# Patient Record
Sex: FEMALE | Race: WHITE | Employment: FULL TIME | URBAN - METROPOLITAN AREA
[De-identification: names, ages, dates, MRNs, and addresses within clinical notes are randomized per-mention and may not be internally consistent; named-entity substitution may affect disease eponyms.]

---

## 2022-08-15 ENCOUNTER — HOSPITAL ENCOUNTER (EMERGENCY)
Facility: HOSPITAL | Age: 32
Discharge: HOME | End: 2022-08-15
Attending: EMERGENCY MEDICINE
Payer: COMMERCIAL

## 2022-08-15 VITALS
SYSTOLIC BLOOD PRESSURE: 118 MMHG | TEMPERATURE: 98 F | RESPIRATION RATE: 16 BRPM | HEART RATE: 81 BPM | OXYGEN SATURATION: 98 % | DIASTOLIC BLOOD PRESSURE: 70 MMHG

## 2022-08-15 DIAGNOSIS — R11.2 NAUSEA AND VOMITING, UNSPECIFIED VOMITING TYPE: Primary | ICD-10-CM

## 2022-08-15 LAB
ALBUMIN SERPL-MCNC: 4.2 G/DL (ref 3.4–5)
ALP LIVER SERPL-CCNC: 45 U/L
ALT SERPL-CCNC: 20 U/L
ANION GAP SERPL CALC-SCNC: 7 MMOL/L (ref 0–18)
AST SERPL-CCNC: 19 U/L (ref 15–37)
BASOPHILS # BLD AUTO: 0.02 X10(3) UL (ref 0–0.2)
BASOPHILS NFR BLD AUTO: 0.2 %
BILIRUB DIRECT SERPL-MCNC: 0.2 MG/DL (ref 0–0.2)
BILIRUB SERPL-MCNC: 0.9 MG/DL (ref 0.1–2)
BILIRUB UR QL: NEGATIVE
BUN BLD-MCNC: 20 MG/DL (ref 7–18)
BUN/CREAT SERPL: 25.3 (ref 10–20)
CALCIUM BLD-MCNC: 8.7 MG/DL (ref 8.5–10.1)
CHLORIDE SERPL-SCNC: 108 MMOL/L (ref 98–112)
CLARITY UR: CLEAR
CO2 SERPL-SCNC: 26 MMOL/L (ref 21–32)
COLOR UR: YELLOW
CREAT BLD-MCNC: 0.79 MG/DL
DEPRECATED RDW RBC AUTO: 42.9 FL (ref 35.1–46.3)
EOSINOPHIL # BLD AUTO: 0.06 X10(3) UL (ref 0–0.7)
EOSINOPHIL NFR BLD AUTO: 0.5 %
ERYTHROCYTE [DISTWIDTH] IN BLOOD BY AUTOMATED COUNT: 12.1 % (ref 11–15)
GFR SERPLBLD BASED ON 1.73 SQ M-ARVRAT: 101 ML/MIN/1.73M2 (ref 60–?)
GLUCOSE BLD-MCNC: 97 MG/DL (ref 70–99)
GLUCOSE UR-MCNC: NEGATIVE MG/DL
HCT VFR BLD AUTO: 45 %
HGB BLD-MCNC: 14.8 G/DL
HGB UR QL STRIP.AUTO: NEGATIVE
IMM GRANULOCYTES # BLD AUTO: 0.03 X10(3) UL (ref 0–1)
IMM GRANULOCYTES NFR BLD: 0.2 %
LEUKOCYTE ESTERASE UR QL STRIP.AUTO: NEGATIVE
LIPASE SERPL-CCNC: 126 U/L (ref 73–393)
LYMPHOCYTES # BLD AUTO: 0.6 X10(3) UL (ref 1–4)
LYMPHOCYTES NFR BLD AUTO: 4.7 %
MCH RBC QN AUTO: 31.6 PG (ref 26–34)
MCHC RBC AUTO-ENTMCNC: 32.9 G/DL (ref 31–37)
MCV RBC AUTO: 95.9 FL
MONOCYTES # BLD AUTO: 0.36 X10(3) UL (ref 0.1–1)
MONOCYTES NFR BLD AUTO: 2.8 %
NEUTROPHILS # BLD AUTO: 11.69 X10 (3) UL (ref 1.5–7.7)
NEUTROPHILS # BLD AUTO: 11.69 X10(3) UL (ref 1.5–7.7)
NEUTROPHILS NFR BLD AUTO: 91.6 %
NITRITE UR QL STRIP.AUTO: NEGATIVE
OSMOLALITY SERPL CALC.SUM OF ELEC: 295 MOSM/KG (ref 275–295)
PH UR: 7 [PH] (ref 5–8)
PLATELET # BLD AUTO: 377 10(3)UL (ref 150–450)
POTASSIUM SERPL-SCNC: 4 MMOL/L (ref 3.5–5.1)
PROT SERPL-MCNC: 8 G/DL (ref 6.4–8.2)
RBC # BLD AUTO: 4.69 X10(6)UL
RBC #/AREA URNS AUTO: >10 /HPF
RBC #/AREA URNS AUTO: >10 /HPF
SARS-COV-2 RNA RESP QL NAA+PROBE: NOT DETECTED
SODIUM SERPL-SCNC: 141 MMOL/L (ref 136–145)
SP GR UR STRIP: 1.02 (ref 1–1.03)
UROBILINOGEN UR STRIP-ACNC: 0.2
WBC # BLD AUTO: 12.8 X10(3) UL (ref 4–11)

## 2022-08-15 PROCEDURE — 96374 THER/PROPH/DIAG INJ IV PUSH: CPT

## 2022-08-15 PROCEDURE — 80048 BASIC METABOLIC PNL TOTAL CA: CPT | Performed by: EMERGENCY MEDICINE

## 2022-08-15 PROCEDURE — 80076 HEPATIC FUNCTION PANEL: CPT | Performed by: EMERGENCY MEDICINE

## 2022-08-15 PROCEDURE — 96375 TX/PRO/DX INJ NEW DRUG ADDON: CPT

## 2022-08-15 PROCEDURE — 96361 HYDRATE IV INFUSION ADD-ON: CPT

## 2022-08-15 PROCEDURE — 99284 EMERGENCY DEPT VISIT MOD MDM: CPT

## 2022-08-15 PROCEDURE — S0028 INJECTION, FAMOTIDINE, 20 MG: HCPCS | Performed by: EMERGENCY MEDICINE

## 2022-08-15 PROCEDURE — 81015 MICROSCOPIC EXAM OF URINE: CPT | Performed by: EMERGENCY MEDICINE

## 2022-08-15 PROCEDURE — 81001 URINALYSIS AUTO W/SCOPE: CPT | Performed by: EMERGENCY MEDICINE

## 2022-08-15 PROCEDURE — 83690 ASSAY OF LIPASE: CPT | Performed by: EMERGENCY MEDICINE

## 2022-08-15 PROCEDURE — 85025 COMPLETE CBC W/AUTO DIFF WBC: CPT | Performed by: EMERGENCY MEDICINE

## 2022-08-15 RX ORDER — LORAZEPAM 2 MG/ML
0.5 INJECTION INTRAMUSCULAR ONCE
Status: COMPLETED | OUTPATIENT
Start: 2022-08-15 | End: 2022-08-15

## 2022-08-15 RX ORDER — ONDANSETRON 2 MG/ML
4 INJECTION INTRAMUSCULAR; INTRAVENOUS ONCE
Status: COMPLETED | OUTPATIENT
Start: 2022-08-15 | End: 2022-08-15

## 2022-08-15 RX ORDER — FAMOTIDINE 10 MG/ML
20 INJECTION, SOLUTION INTRAVENOUS ONCE
Status: COMPLETED | OUTPATIENT
Start: 2022-08-15 | End: 2022-08-15

## 2022-08-15 RX ORDER — ONDANSETRON 4 MG/1
4 TABLET, ORALLY DISINTEGRATING ORAL EVERY 4 HOURS PRN
Qty: 12 TABLET | Refills: 0 | Status: SHIPPED | OUTPATIENT
Start: 2022-08-15 | End: 2022-08-22

## 2022-08-15 NOTE — ED INITIAL ASSESSMENT (HPI)
31y F to ED via [facility] for nausea and vomiting. Has been nauseous with frequent vomiting since about 1am. Attempted PO zofran without relief.

## 2023-05-16 ENCOUNTER — APPOINTMENT (OUTPATIENT)
Dept: URBAN - METROPOLITAN AREA CLINIC 314 | Age: 33
Setting detail: DERMATOLOGY
End: 2023-05-28

## 2023-05-16 DIAGNOSIS — L81.4 OTHER MELANIN HYPERPIGMENTATION: ICD-10-CM

## 2023-05-16 DIAGNOSIS — L91.8 OTHER HYPERTROPHIC DISORDERS OF THE SKIN: ICD-10-CM

## 2023-05-16 DIAGNOSIS — D22 MELANOCYTIC NEVI: ICD-10-CM

## 2023-05-16 DIAGNOSIS — Z12.83 ENCOUNTER FOR SCREENING FOR MALIGNANT NEOPLASM OF SKIN: ICD-10-CM

## 2023-05-16 PROBLEM — D48.5 NEOPLASM OF UNCERTAIN BEHAVIOR OF SKIN: Status: ACTIVE | Noted: 2023-05-16

## 2023-05-16 PROBLEM — D22.5 MELANOCYTIC NEVI OF TRUNK: Status: ACTIVE | Noted: 2023-05-16

## 2023-05-16 PROCEDURE — 99203 OFFICE O/P NEW LOW 30 MIN: CPT | Mod: 25

## 2023-05-16 PROCEDURE — OTHER MIPS QUALITY: OTHER

## 2023-05-16 PROCEDURE — OTHER BIOPSY BY SHAVE METHOD: OTHER

## 2023-05-16 PROCEDURE — 11102 TANGNTL BX SKIN SINGLE LES: CPT

## 2023-05-16 PROCEDURE — 11103 TANGNTL BX SKIN EA SEP/ADDL: CPT

## 2023-05-16 PROCEDURE — OTHER COUNSELING: OTHER

## 2023-05-16 ASSESSMENT — LOCATION SIMPLE DESCRIPTION DERM
LOCATION SIMPLE: UPPER BACK
LOCATION SIMPLE: RIGHT 4TH TOE
LOCATION SIMPLE: CHEST
LOCATION SIMPLE: RIGHT ANTERIOR NECK
LOCATION SIMPLE: RIGHT UPPER BACK

## 2023-05-16 ASSESSMENT — LOCATION ZONE DERM
LOCATION ZONE: NECK
LOCATION ZONE: TOE
LOCATION ZONE: TRUNK

## 2023-05-16 ASSESSMENT — LOCATION DETAILED DESCRIPTION DERM
LOCATION DETAILED: SUPERIOR THORACIC SPINE
LOCATION DETAILED: RIGHT LATERAL SUPERIOR CHEST
LOCATION DETAILED: RIGHT SUPERIOR MEDIAL UPPER BACK
LOCATION DETAILED: RIGHT MEDIAL SUPERIOR CHEST
LOCATION DETAILED: RIGHT CLAVICULAR NECK
LOCATION DETAILED: LEFT MEDIAL SUPERIOR CHEST
LOCATION DETAILED: RIGHT LATERAL 4TH TOE

## 2023-05-16 NOTE — PROCEDURE: COUNSELING
Patient Specific Counseling (Will Not Stick From Patient To Patient): -\\nPt was counseled on a potential snip removal
Detail Level: Detailed
Detail Level: Generalized

## 2023-05-16 NOTE — PROCEDURE: BIOPSY BY SHAVE METHOD
Size Of Lesion In Cm: 0
Hemostasis: Drysol
Electrodesiccation Text: The wound bed was treated with electrodesiccation after the biopsy was performed.
Biopsy Method: Dermablade
Depth Of Biopsy: dermis
Anesthesia Volume In Cc (Will Not Render If 0): 0.5
Hide Anesthesia Volume?: No
Curettage Text: The wound bed was treated with curettage after the biopsy was performed.
Silver Nitrate Text: The wound bed was treated with silver nitrate after the biopsy was performed.
Post-Care Instructions: I reviewed with the patient in detail post-care instructions. Patient is to keep the biopsy site dry overnight, and then apply bacitracin twice daily until healed. Patient may apply hydrogen peroxide soaks to remove any crusting.
Anesthesia Type: 2% lidocaine with epinephrine and a 1:10 solution of 8.4% sodium bicarbonate
Detail Level: Detailed
Billing Type: Third-Party Bill
Notification Instructions: Patient will be notified of biopsy results. However, patient instructed to call the office if not contacted within 2 weeks.
Cryotherapy Text: The wound bed was treated with cryotherapy after the biopsy was performed.
Information: Selecting Yes will display possible errors in your note based on the variables you have selected. This validation is only offered as a suggestion for you. PLEASE NOTE THAT THE VALIDATION TEXT WILL BE REMOVED WHEN YOU FINALIZE YOUR NOTE. IF YOU WANT TO FAX A PRELIMINARY NOTE YOU WILL NEED TO TOGGLE THIS TO 'NO' IF YOU DO NOT WANT IT IN YOUR FAXED NOTE.
Consent: Written consent was obtained and risks were reviewed including but not limited to scarring, infection, bleeding, scabbing, incomplete removal, nerve damage and allergy to anesthesia.
Electrodesiccation And Curettage Text: The wound bed was treated with electrodesiccation and curettage after the biopsy was performed.
Biopsy Type: H and E
Type Of Destruction Used: Curettage
Dressing: Band-Aid
Was A Bandage Applied: Yes
Wound Care: Vaseline

## 2024-03-09 ENCOUNTER — HOSPITAL ENCOUNTER (EMERGENCY)
Facility: HOSPITAL | Age: 34
Discharge: HOME | End: 2024-03-09
Attending: EMERGENCY MEDICINE
Payer: COMMERCIAL

## 2024-03-09 ENCOUNTER — APPOINTMENT (OUTPATIENT)
Dept: MRI IMAGING | Facility: HOSPITAL | Age: 34
End: 2024-03-09
Attending: EMERGENCY MEDICINE
Payer: COMMERCIAL

## 2024-03-09 ENCOUNTER — APPOINTMENT (OUTPATIENT)
Dept: CT IMAGING | Facility: HOSPITAL | Age: 34
End: 2024-03-09
Attending: EMERGENCY MEDICINE
Payer: COMMERCIAL

## 2024-03-09 VITALS
DIASTOLIC BLOOD PRESSURE: 75 MMHG | HEART RATE: 61 BPM | SYSTOLIC BLOOD PRESSURE: 116 MMHG | BODY MASS INDEX: 32.96 KG/M2 | OXYGEN SATURATION: 97 % | HEIGHT: 67 IN | RESPIRATION RATE: 18 BRPM | TEMPERATURE: 97 F | WEIGHT: 210 LBS

## 2024-03-09 DIAGNOSIS — M54.2 NECK PAIN: Primary | ICD-10-CM

## 2024-03-09 DIAGNOSIS — R51.9 NONINTRACTABLE HEADACHE, UNSPECIFIED CHRONICITY PATTERN, UNSPECIFIED HEADACHE TYPE: ICD-10-CM

## 2024-03-09 LAB
ALBUMIN SERPL-MCNC: 4.7 G/DL (ref 3.2–4.8)
ALP LIVER SERPL-CCNC: 40 U/L
ALT SERPL-CCNC: 19 U/L
ANION GAP SERPL CALC-SCNC: 2 MMOL/L (ref 0–18)
AST SERPL-CCNC: 28 U/L (ref ?–34)
BASOPHILS # BLD AUTO: 0.03 X10(3) UL (ref 0–0.2)
BASOPHILS NFR BLD AUTO: 0.6 %
BILIRUB DIRECT SERPL-MCNC: 0.2 MG/DL (ref ?–0.3)
BILIRUB SERPL-MCNC: 0.7 MG/DL (ref 0.3–1.2)
BUN BLD-MCNC: 13 MG/DL (ref 9–23)
BUN/CREAT SERPL: 15.5 (ref 10–20)
CALCIUM BLD-MCNC: 9.6 MG/DL (ref 8.7–10.4)
CHLORIDE SERPL-SCNC: 111 MMOL/L (ref 98–112)
CO2 SERPL-SCNC: 26 MMOL/L (ref 21–32)
CREAT BLD-MCNC: 0.84 MG/DL
DEPRECATED RDW RBC AUTO: 42.4 FL (ref 35.1–46.3)
EGFRCR SERPLBLD CKD-EPI 2021: 93 ML/MIN/1.73M2 (ref 60–?)
EOSINOPHIL # BLD AUTO: 0.12 X10(3) UL (ref 0–0.7)
EOSINOPHIL NFR BLD AUTO: 2.3 %
ERYTHROCYTE [DISTWIDTH] IN BLOOD BY AUTOMATED COUNT: 12.3 % (ref 11–15)
GLUCOSE BLD-MCNC: 88 MG/DL (ref 70–99)
HCT VFR BLD AUTO: 42.7 %
HGB BLD-MCNC: 14.1 G/DL
IMM GRANULOCYTES # BLD AUTO: 0.01 X10(3) UL (ref 0–1)
IMM GRANULOCYTES NFR BLD: 0.2 %
LYMPHOCYTES # BLD AUTO: 2.29 X10(3) UL (ref 1–4)
LYMPHOCYTES NFR BLD AUTO: 43 %
MCH RBC QN AUTO: 30.7 PG (ref 26–34)
MCHC RBC AUTO-ENTMCNC: 33 G/DL (ref 31–37)
MCV RBC AUTO: 92.8 FL
MONOCYTES # BLD AUTO: 0.34 X10(3) UL (ref 0.1–1)
MONOCYTES NFR BLD AUTO: 6.4 %
NEUTROPHILS # BLD AUTO: 2.54 X10 (3) UL (ref 1.5–7.7)
NEUTROPHILS # BLD AUTO: 2.54 X10(3) UL (ref 1.5–7.7)
NEUTROPHILS NFR BLD AUTO: 47.5 %
OSMOLALITY SERPL CALC.SUM OF ELEC: 288 MOSM/KG (ref 275–295)
PLATELET # BLD AUTO: 409 10(3)UL (ref 150–450)
POTASSIUM SERPL-SCNC: 3.7 MMOL/L (ref 3.5–5.1)
PROCALCITONIN SERPL-MCNC: <0.04 NG/ML (ref ?–0.05)
PROT SERPL-MCNC: 7.7 G/DL (ref 5.7–8.2)
RBC # BLD AUTO: 4.6 X10(6)UL
SODIUM SERPL-SCNC: 139 MMOL/L (ref 136–145)
WBC # BLD AUTO: 5.3 X10(3) UL (ref 4–11)

## 2024-03-09 PROCEDURE — 96375 TX/PRO/DX INJ NEW DRUG ADDON: CPT

## 2024-03-09 PROCEDURE — 70498 CT ANGIOGRAPHY NECK: CPT | Performed by: EMERGENCY MEDICINE

## 2024-03-09 PROCEDURE — 99285 EMERGENCY DEPT VISIT HI MDM: CPT

## 2024-03-09 PROCEDURE — 80048 BASIC METABOLIC PNL TOTAL CA: CPT | Performed by: EMERGENCY MEDICINE

## 2024-03-09 PROCEDURE — 80076 HEPATIC FUNCTION PANEL: CPT | Performed by: EMERGENCY MEDICINE

## 2024-03-09 PROCEDURE — 70496 CT ANGIOGRAPHY HEAD: CPT | Performed by: EMERGENCY MEDICINE

## 2024-03-09 PROCEDURE — 85025 COMPLETE CBC W/AUTO DIFF WBC: CPT | Performed by: EMERGENCY MEDICINE

## 2024-03-09 PROCEDURE — 70551 MRI BRAIN STEM W/O DYE: CPT | Performed by: EMERGENCY MEDICINE

## 2024-03-09 PROCEDURE — 84145 PROCALCITONIN (PCT): CPT | Performed by: EMERGENCY MEDICINE

## 2024-03-09 PROCEDURE — 96374 THER/PROPH/DIAG INJ IV PUSH: CPT

## 2024-03-09 RX ORDER — DIPHENHYDRAMINE HYDROCHLORIDE 50 MG/ML
25 INJECTION INTRAMUSCULAR; INTRAVENOUS ONCE
Status: COMPLETED | OUTPATIENT
Start: 2024-03-09 | End: 2024-03-09

## 2024-03-09 RX ORDER — KETOROLAC TROMETHAMINE 15 MG/ML
15 INJECTION, SOLUTION INTRAMUSCULAR; INTRAVENOUS ONCE
Status: COMPLETED | OUTPATIENT
Start: 2024-03-09 | End: 2024-03-09

## 2024-03-09 RX ORDER — TRAMADOL HYDROCHLORIDE 50 MG/1
TABLET ORAL EVERY 6 HOURS PRN
Qty: 10 TABLET | Refills: 0 | Status: SHIPPED | OUTPATIENT
Start: 2024-03-09 | End: 2024-03-14

## 2024-03-09 RX ORDER — QUETIAPINE FUMARATE 50 MG/1
50 TABLET, FILM COATED ORAL NIGHTLY
COMMUNITY

## 2024-03-09 RX ORDER — LAMOTRIGINE 200 MG/1
200 TABLET ORAL DAILY
COMMUNITY

## 2024-03-09 RX ORDER — METOCLOPRAMIDE HYDROCHLORIDE 5 MG/ML
10 INJECTION INTRAMUSCULAR; INTRAVENOUS ONCE
Status: COMPLETED | OUTPATIENT
Start: 2024-03-09 | End: 2024-03-09

## 2024-03-09 RX ORDER — ONDANSETRON 4 MG/1
4 TABLET, ORALLY DISINTEGRATING ORAL EVERY 4 HOURS PRN
Qty: 10 TABLET | Refills: 0 | Status: SHIPPED | OUTPATIENT
Start: 2024-03-09 | End: 2024-03-16

## 2024-03-09 NOTE — ED PROVIDER NOTES
Patient Seen in: [facility] Emergency Department      History     Chief Complaint   Patient presents with    Headache    Fever     Stated Complaint: headaches,neck pain,fevers,vomiting    Subjective:   34-year-old female history of bipolar here with chief complaint of 4 days of neck pain and 3 days of headache.  She said she woke up 4 days ago and her neck felt stiff.  It did not hurt with movements.  Later that day she started getting some pressure in her occipital region.  The headache is about up 4-5 out of 10 but it is not going away for the past 3 days.  It was not sudden.  Her neck still has pain with certain positions or movement.  She has no cough or congestion.  No nausea or vomiting.  No photophobia.  No focal weakness or numbness.  She said she had some chills yesterday but no recorded fevers.  No dizziness or vertigo.  Has had vertigo in the past.            Objective:   Past Medical History:   Diagnosis Date    Bipolar affective (HCC)               Past Surgical History:   Procedure Laterality Date    TOTAL ABDOM HYSTERECTOMY                  No pertinent social history.            Review of Systems    Positive for stated complaint: headaches,neck pain,fevers,vomiting  Other systems are as noted in HPI.  Constitutional and vital signs reviewed.      All other systems reviewed and negative except as noted above.    Physical Exam     ED Triage Vitals [03/09/24 0817]   /90   Pulse 88   Resp 18   Temp 97.1 °F (36.2 °C)   Temp src Temporal   SpO2 100 %   O2 Device None (Room air)       Current:/75   Pulse 61   Temp 97.1 °F (36.2 °C) (Temporal)   Resp 18   Ht 170.2 cm (5' 7\")   Wt 95.3 kg   SpO2 97%   BMI 32.89 kg/m²         Physical Exam  Constitutional:       General: She is not in acute distress.     Comments: Will spell.  Eyes open in the lit room.  Moving head in conversation.  Laughing during conversation.   HENT:      Head: Normocephalic.      Mouth/Throat:      Mouth:  Mucous membranes are moist.      Pharynx: Oropharynx is clear.   Eyes:      Extraocular Movements: Extraocular movements intact.      Pupils: Pupils are equal, round, and reactive to light.   Cardiovascular:      Rate and Rhythm: Normal rate and regular rhythm.      Heart sounds: Normal heart sounds.   Pulmonary:      Effort: Pulmonary effort is normal.      Breath sounds: Normal breath sounds.   Abdominal:      Palpations: Abdomen is soft.      Tenderness: There is no abdominal tenderness.   Musculoskeletal:         General: No swelling or tenderness. Normal range of motion.      Cervical back: Normal range of motion and neck supple.   Lymphadenopathy:      Cervical: No cervical adenopathy.   Skin:     General: Skin is warm.      Capillary Refill: Capillary refill takes less than 2 seconds.   Neurological:      Mental Status: She is alert and oriented to person, place, and time.      Sensory: No sensory deficit.      Motor: No weakness.      Comments: Negative jolt accentuation test.  She is able to shake her head back-and-forth.  Negative Kernig's and Brudzinski.  Completely supple neck on exam.               ED Course     Labs Reviewed   HEPATIC FUNCTION PANEL (7) - Normal   BASIC METABOLIC PANEL (8) - Normal   PROCALCITONIN - Normal   CBC WITH DIFFERENTIAL WITH PLATELET    Narrative:     The following orders were created for panel order CBC With Differential With Platelet.  Procedure                               Abnormality         Status                     ---------                               -----------         ------                     CBC W/ DIFFERENTIAL[[id number]]                              Final result                 Please view results for these tests on the individual orders.   RAINBOW DRAW LAVENDER   RAINBOW DRAW LIGHT GREEN   RAINBOW DRAW GOLD   RAINBOW DRAW BLUE   CBC W/ DIFFERENTIAL          ED Course as of 03/10/24 1404  ------------------------------------------------------------  Time:  03/09 1017  Comment: Labs independently interpreted by me.  CBC, CMP unremarkable  ------------------------------------------------------------  Time: 03/10 1403  Comment: CTA and MRI independently interpreted by me.  No stroke on MRI.  Some possible congenital abnormalities on CTA but no aneurysms  ------------------------------------------------------------  Time: 03/10 1404  Comment: Patient responded to medications and felt better when she was in the ER.  This note was added the day after.              [facility]      MRI BRAIN WO ACUTE (3) SEQUENCE (CPT=70551)    Result Date: 3/9/2024  CONCLUSION:  1. Limited ER protocol 3 sequence imaging of the brain was performed.  No acute intracranial abnormality. Specifically, no evidence of acute or early subacute infarction. 2. As there is no acute posterior fossa infarction, basilar and distal vertebral artery findings described on same date CT arteriography of the head and neck are therefore likely chronic and/or developmental.   elm-remote  Dictated by (CST): Tito Cheek MD on 3/09/2024 at 12:55 PM     Finalized by (CST): Tito Cheek MD on 3/09/2024 at 12:57 PM          CTA BRAIN + CTA CAROTIDS (CPT=70496/54987)    Result Date: 3/9/2024  CONCLUSION:  1. Both vertebral arteries are diminutive in caliber, but otherwise patent.  There is, however, thready flow throughout the basilar artery as well as the distal V4 segments of both vertebral arteries.  These findings could relate to sequelae of chronic vertebrobasilar insufficiency or a developmental variant.  Although acute basilar occlusion is unlikely, suggest a follow-up MRI of the brain to exclude acute infarction.  Findings were discussed with Dr. Suarez at time of dictation. 2. No other proximal intracranial flow limiting stenosis/large vessel occlusion.  There is a fetal configuration of both posterior cerebral arteries.  No definite intracranial aneurysm identified.  No hemodynamically significant stenosis of  the cervical carotid or vertebral arteries. 3. Noncontrast head CT demonstrates no acute intracranial abnormality. 4. Lesser incidental findings as above.   elm-remote  Dictated by (CST): Tito Cheek MD on 3/09/2024 at 10:21 AM     Finalized by (CST): Tito Cheek MD on 3/09/2024 at 10:31 AM                                            Medical Decision Making  Patient here with neck pain that was followed by some headache.  This has been going on for 4 days she has no neurologic complaints such as weakness or numbness.  No vertigo.  Completely supple on exam afebrile with normal vitals.  She looks well.  Unlikely meningitis but we discussed the possibility.  Musculoskeletal problems are in the differential.  She could have dissection or mass.  Will give Benadryl Reglan while doing workup and will discuss with neurology.    Amount and/or Complexity of Data Reviewed  Labs: ordered. Decision-making details documented in ED Course.     Details: Labs normal  Radiology: ordered and independent interpretation performed.     Details: CTA showed some abnormal flow in the vertebral basilar possible anomaly.  Discussed with neurologist Dr. Barnes who does not feel this is an acute problem.  An MRI was performed and showed no acute finding.  Discussion of management or test interpretation with external provider(s): Patient did improve after medication.  Does not seem to be stroke, dissection, meningitis or hemorrhage.  Does need close neurologic follow-up.  She had a congenital anomaly likely seen on her CTA and I discussed that with the radiologist.  She was comfortable with the follow-up plan and will return to the ER for changes or worsening.  Prescription for pain medicine written.  She voiced understanding.  Never looked to be in significant distress    Risk  OTC drugs.  Prescription drug management.        Disposition and Plan     Clinical Impression:  1. Neck pain    2. Nonintractable headache, unspecified  chronicity pattern, unspecified headache type         Disposition:  Discharge  3/9/2024  3:16 pm    Follow-up:  Aminah Persaud DO  [address] [address]  [phone]    Follow up            Medications Prescribed:  Discharge Medication List as of 3/9/2024  3:24 PM        START taking these medications    Details   traMADol 50 MG Oral Tab Take 1-2 tablets ( mg total) by mouth every 6 (six) hours as needed for Pain., Normal, Disp-10 tablet, R-0      ondansetron 4 MG Oral Tablet Dispersible Take 1 tablet (4 mg total) by mouth every 4 (four) hours as needed for Nausea., Normal, Disp-10 tablet, R-0

## 2024-03-09 NOTE — ED INITIAL ASSESSMENT (HPI)
Pt to the ed for headache and neck pain for 5 days  Also reporting chills with nausea and vomiting that began this morning

## 2024-03-09 NOTE — ED QUICK NOTES
Pain and neck stiffness began Tuesday morning.   Head pain beginning Wednesday, constant pressure.

## 2024-03-09 NOTE — DISCHARGE INSTRUCTIONS
To the ER for changes or worsening or new symptoms.  Read all instructions for further recommendations.  Take tramadol as needed for pain.  Ibuprofen every 8 hours as needed.  Zofran for nausea.  Follow-up with neurology as soon as possible for further evaluation.

## 2024-03-14 ENCOUNTER — OFFICE VISIT (OUTPATIENT)
Dept: NEUROLOGY | Facility: CLINIC | Age: 34
End: 2024-03-14
Payer: COMMERCIAL

## 2024-03-14 VITALS — HEIGHT: 67 IN | WEIGHT: 210 LBS | BODY MASS INDEX: 32.96 KG/M2

## 2024-03-14 DIAGNOSIS — M54.81 BILATERAL OCCIPITAL NEURALGIA: ICD-10-CM

## 2024-03-14 DIAGNOSIS — G43.E11 INTRACTABLE CHRONIC MIGRAINE WITH AURA WITH STATUS MIGRAINOSUS: Primary | ICD-10-CM

## 2024-03-14 DIAGNOSIS — G44.40 MEDICATION OVERUSE HEADACHE: ICD-10-CM

## 2024-03-14 DIAGNOSIS — G44.86 CERVICOGENIC HEADACHE: ICD-10-CM

## 2024-03-14 DIAGNOSIS — R42 VERTIGO: ICD-10-CM

## 2024-03-14 PROCEDURE — 3008F BODY MASS INDEX DOCD: CPT | Performed by: OTHER

## 2024-03-14 PROCEDURE — 99204 OFFICE O/P NEW MOD 45 MIN: CPT | Performed by: OTHER

## 2024-03-14 RX ORDER — MECLIZINE HCL 25MG 25 MG/1
25 TABLET, CHEWABLE ORAL 3 TIMES DAILY PRN
Qty: 90 TABLET | Refills: 0 | Status: SHIPPED | OUTPATIENT
Start: 2024-03-14 | End: 2024-04-13

## 2024-03-14 RX ORDER — ELETRIPTAN HYDROBROMIDE 40 MG/1
TABLET, FILM COATED ORAL
Qty: 8 TABLET | Refills: 0 | Status: SHIPPED | OUTPATIENT
Start: 2024-03-14

## 2024-03-14 RX ORDER — MECLIZINE HCL 25MG 25 MG/1
TABLET, CHEWABLE ORAL
COMMUNITY
Start: 2023-12-08 | End: 2024-03-14 | Stop reason: ALTCHOICE

## 2024-03-14 RX ORDER — PROPRANOLOL HYDROCHLORIDE 10 MG/1
10 TABLET ORAL 2 TIMES DAILY
Qty: 180 TABLET | Refills: 0 | Status: SHIPPED | OUTPATIENT
Start: 2024-03-14 | End: 2024-06-12

## 2024-03-14 NOTE — PATIENT INSTRUCTIONS
HEADACHE / MIGRAINE LIFESTYLE INFORMATION     Headache Preventive Treatment:   Please keep in mind that it takes 4-6 weeks for the medication to start working well and 2-3 months at the appropriate dose before deciding if it will be useful or not. If it is not helping at all by this time, then we will discuss other medications to try. Supplements may take 3-6 months until you see full effect.     Natural supplements:  Magnesium Oxide 400 mg at bedtime   Vitamin B2- 400mg in the morning     Vitamins and herbs that show potential    Magnesium: Magnesium (400 mg at bedtime) has a relaxant effect on smooth muscles such as blood vessels. Individuals suffering from frequent or daily headache usually have low magnesium levels which can be increase with daily supplementation of 400-750 mg. Three trials found 40-90% average headache reduction  when used as a preventative. Magnesium also demonstrated the benefit in menstrually related migraine.  Magnesium is part of the messenger system in the serotonin cascade and it is a good muscle relaxant.  It is also useful for constipation which can be a side effect of other medications used to treat migraine. Good sources include nuts, whole grains, and tomatoes.    Riboflavin (vitamin B 2) 400 mg in the morning. This vitamin assists nerve cells in the production of ATP a principal energy storing molecule.  It is necessary for many chemical reactions in the body.  There have been at least 3 clinical trials of riboflavin using 400 mg per day all of which suggested that migraine frequency can be decreased.  All 3 trials showed significant improvement in over half of migraine sufferers.  The supplement is found in bread, cereal, milk, meat, and poultry.  Most Americans get more riboflavin than the recommended daily allowance, however riboflavin deficiency is not necessary for the supplements to help prevent headache.    Melatonin: Increasing evidence shows correlation between melatonin  secretion and headache conditions.  Melatonin supplementation has decreased headache intensity and duration.  It is widely used as a sleep aid.  Sleep is natures way of dealing with migraine.  A dose of 3 mg is recommended to start for headaches including cluster headache. Higher doses up to 15 mg has been reviewed for use in Cluster headache and have been used.  The rationale behind using melatonin for cluster is that many theories regarding the cause of Cluster headache center around the disruption of the normal circadian rhythm in the brain.  This helps restore the normal circadian rhythm.    Kelly: Kelly has a small amount of antihistamine and anti-inflammatory action which may help headache.  It is primarily used for nausea and may aid in the absorption of other medications.    HEADACHE DIET: Foods and beverages which may trigger migraine  Note that only 20% of headache patients are food sensitive. You will know if you are food sensitive if you get a headache consistently 20 minutes to 2 hours after eating a certain food. Only cut out a food if it causes headaches, otherwise you might remove foods you enjoy! What matters most for diet is to eat a well balanced healthy diet full of vegetables and low fat protein, and to not miss meals.    Chocolate, other sweets    ALL cheeses except cottage and cream cheese    Dairy products, yogurt, sour cream, ice cream    Liver    Meat extracts (Bovril, Marmite, meat tenderizers)    Meats or fish which have undergone aging, fermenting, pickling or smoking. These include: Hotdogs,salami,Lox,sausage,  mortadellas,smoked salmon, pepperoni, Pickled herring    Pods of broad bean (English beans, Chinese pea pods, Italian (pushpa) beans, lima and navy beans    Ripe avocado, ripe banana    Yeast extracts or active yeast preparations such as Bedolla's or Maricarmen's (commercial bakes goods are permitted)    Tomato based foods, pizza (lasagna, etc.)    MSG (monosodium glutamate) is  disguised as many things; look for these common aliases:  Monopotassium glutamate  Autolysed yeast  Hydrolysed protein  Sodium caseinate  “flavorings”  “all natural preservatives\"    Nutrasweet    Avoid all other foods that convincingly provoke headaches.    Headache Prevention Strategies:    1. Maintain a headache diary; learn to identify and avoid triggers. Common triggers include:    Emotional triggers:  Emotional/Upset family or friends  Emotional/Upset occupation  Business reversal/success  Anticipation anxiety  Crisis-serious  Post-crisis periodNew job/position     Physical triggers:  Vacation Day  Weekend  Strenuous Exercise  High Altitude Location  New Move  Menstrual Day  Physical Illness  Oversleep/Not enough sleep  Weather changes  Light: Photophobia or light sesnitivity treatment involves a balance between desensitization and reduction in overly strong input. Use dark polarized glasses outside, but not inside. Avoid bright or fluorescent light, but do not dim environment to the point that going into a normally lit room hurts. Consider FL-41 tint lenses, which reduce the most irritating wavelengths without blocking too much light.  These can be obtained at [employer] or [employer]  Foods: see list above.    2. Limit use of acute treatments (over-the-counter medications, triptans, etc.) to no more than 2 days per week or 10 days per month to prevent medication overuse headache (rebound headache).      3. Follow a regular schedule (including weekends and holidays):  Don't skip meals. Eat a balanced diet.  8 hours of sleep nightly.  Minimize stress.  Exercise 30 minutes per day. Being overweight is associated with a 5 times increased risk of chronic migraine.  Keep well hydrated and drink 6-8 glasses of water per day.    4. Initiate non-pharmacologic measures at the earliest onset of your headache.  Rest and quiet environment.  Relax and reduce stress. Ashenjt9Nwgzj is a free wilton that can instruct  you on    some simple relaxtion and breathing techniques. Http://LookIt.[address] is a    free website that provides teaching videos on relaxation.  Also, there are  many apps that   can be downloaded for “mindful” relaxation.  An wilton called YOGA NIDRA will help walk you through mindfulness.  Cold compresses.    5. Don't wait!! Take the maximum allowable dosage of prescribed medication at the first sign of migraine.    6. Compliance:  Take prescribed medication regularly as directed and at the first sign of a migraine.    7. Communicate:  Call your physician when problems arise, especially if your headaches change, increase in frequency/severity, or become associated with neurological symptoms (weakness, numbness, slurred speech, etc.).    8. Headache/pain management therapies: Consider various complementary methods, including medication, behavioral therapy, psychological counselling, biofeedback, massage therapy, acupuncture, dry needling, and other modalities.  Such measures may reduce the need for medications. Counseling for pain management, where patients learn to function and ignore/minimize their pain, seems to work very well.    9. Recommend changing family's attention and focus away from patient's headaches. Instead, emphasize daily activities. If first question of day is 'How are your headaches/Do you have a headache today?', then patient will constantly think about headaches, thus making them worse. Goal is to re-direct attention away from headaches, toward daily activities and other distractions.    10. Helpful Websites:  www.AmericanHeadacheSociety.org  www.migrainetrust.org  www.headaches.org  www.migraine.org.uk  www.achenet.org    11. HEADACHE EXPECTATIONS:  There are many types of headaches, and only a rare few in which complete relief can be expected.  In general, there is no cure for headache, especially migraine based headaches.  There is nothing available that completely prevents headaches from  occurring, breaking through, or having periodic flare-ups and fluctuations.  Regardless of what you are using on a daily basis for prevention, episodic headaches should still be expected, and periods where frequency may escalate and fluctuate are unavoidable.     There is no quick fix for most headaches.  Furthermore, the longer you have had high frequency headaches (such as chronic daily headache), the longer it will likely take to expect any improvement.  In fact, some people will never improve, regardless of how many medications or other treatments we try.  Our treatment strategy is to evaluate for possible causes of your headache, although testing is usually always normal, even in cases of daily continuous headaches for years.  Most types of headache such as migraine are electrical brain disorders (similar to how epilepsy is an electrical brain disorders).  Therefore, there is no testing that will reveal this \"dysfunctional electrical circuitry\" such on MRI, or other testing.  We try to find a medication that may help lessen the frequency and/or severity of your headaches.  The goal is not to completely stop them from happening, although if that happens, great!  Different people respond to different medications, and some people just don't respond to anything, so it's usually a matter of trying different options.  We can not predict if or when exactly you will respond to a treatment that we provide.     Preventive headache medications take 4-6 weeks to start working, and 2-3 months to see full effect, assuming you reach an effective dose.  Therefore, calling or messaging frequently because you have a headache flare prior to the 3 month rosaline is unlikely to change anything, and unfortunately there is nothing available that will expedite this, so please try to avoid this.  Our recommendation will generally be to give it adequate time first.  If you are unable to wait it out for medications to work, we can also try  IV infusions for some temporary relief.       In general, the best that preventive medications or other treatments (including Botox) are able to offer in migraine management (variable in other headache types) is a 50% improvement in frequency and/or severity of headache.  That is our goal, and any additional benefit is considered a bonus.  Some people do significantly better than this, others do not get close to this.  Therefore, if your headaches are not improving by at least 3 months on your preventive strategy, contact us and we can discuss further adjustments.  Keep in mind that complete headache cure is not a realistic expectation.    Our Team:  The nursing staff, and medical assistants are a major part of YOUR TREATMENT TEAM and will be handling your phone calls and inquiries, if any. Unless explicitly told otherwise at the time of your office visit, your study results and ensuing treatment plans will be released via End[employer]ed and discussed during your follow-up appointment.     [employer]hart: Please ask the schedulers to give you an activation code. The main way of communication is by [employer]hart rather than phone lines, so if you have not signed up, please do so. Endymed is also the way that you can review your labs and testing.  We are not able to contact everyone to tell them results are normal.  If you do not hear back from us regarding testing you have had, it should be considered normal or within normal range.  If you have any questions about the results, you are free to message us.     Endymed is meant for simple questions regarding medications, possible side effects, or other simple straight forward questions in limited sentences, rather than multiple paragraphs of discussion.  Endymed is not meant for, or efficient for these complex questions, extensive questions, extensive medication adjustments, complex new symptoms or concerns.  These issues beyond simple questions require a follow up visit with  myself,      Refills:  Please pay attention to when your refills will need to be renewed. Due to the volume of phone calls daily, this could potentially take a few days, although we certainly try to honor your refill requests as soon as we can.  You should call at least 1 week in advance of needing a refill to ensure you do not run out of medication.  Keep in mind that refill requests on Fridays may not be filled until the following week.

## 2024-03-14 NOTE — PROGRESS NOTES
[facility]  [address] [place] [address], [address]  [facility] [address]  [phone]              Date: March 14, 2024  Patient Name: Mary Tony   MRN: [id number]    Reason for Evaluation: Headache     HPI:     Mary Tony is a 34 year old woman with past medical history of bipolar disorder who presents with neck pain and headache.      MRI brain: no acute findings.  CTA H/N: diminutive vertebral arteries and basilar artery.      Last week, she went home after 48 hour shift, always feels like she has a low-grade headache with worsening here and there.  Went to take a 20 min nap, felt like she could not move her neck, with radiating pain into her thoracic back and scapula with severe headache pain.  Neck range of motion improved but remained stiff and feeling like it was swollen.      Went for another 48 hour shift, then went to ER.  She leaves for [employer] tomorrow.      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      Continuous headache: slowly developing over past few years, thought it was part of her job.  Band-like pain, occiput.  Dull constant pain.  \"Swollen\" type of pain.  Severe.  Photophobia, phonophobia, nausea.      She is going to get tested for RA because she has joint swelling and lymphadenopathy.      Treatment: uses Tylenol. Ibuprofen but these are not helpful.  ER gave her Tramadol.  Uses >2 days per week.      OUTPATIENT MEDICATIONS  Current Outpatient Medications on File Prior to Visit   Medication Sig Dispense Refill    lamoTRIgine 200 MG Oral Tab Take 1 tablet (200 mg total) by mouth daily.      QUEtiapine 50 MG Oral Tab Take 1 tablet (50 mg total) by mouth nightly.      traMADol 50 MG Oral Tab Take 1-2 tablets ( mg total) by mouth every 6 (six) hours as needed for Pain. 10 tablet 0    ondansetron 4 MG Oral Tablet Dispersible Take 1 tablet (4 mg total) by mouth every 4 (four) hours as needed for Nausea. 10 tablet 0     No current facility-administered medications on file  prior to visit.       MEDICAL HISTORY  Past Medical History:   Diagnosis Date    Bipolar affective (HCC)        SURGICAL HISTORY  Past Surgical History:   Procedure Laterality Date    TOTAL ABDOM HYSTERECTOMY         SOCIAL HISTORY  Social History     Socioeconomic History    Marital status:        FAMILY HISTORY  No family history on file.    ALLERGIES  Allergies   Allergen Reactions    Shellfish-Derived Products ANGIOEDEMA       REVIEW OF SYSTEMS:   13-point review of systems was done and is negative unless otherwise stated in HPI.     PHYSICAL EXAM:   Ht 67\"   Wt 210 lb (95.3 kg)   BMI 32.89 kg/m²   General appearance: Well appearing, alert and in no acute distress  Skin: skin color normal.  No rashes or lesions.    Head: Normocephalic, atraumatic.    Neurological exam:    Mental Status:   Attention/Concentration: intact attention on bedside test   Fund of knowledge: intact  Speech: no dysarthria or aphasia     Cranial Nerves:  Pupils: OD 4 mm to 3 mm;  OS 4 mm to 3 mm   Visual Fields: R and L visual fields full to confrontation  CN III, CN IV, CN VI (Extraocular movements): intact.    CN V:   intact sensation to light touch in all three divisions of the trigeminal nerves bilaterally.  CN VII: normal facial muscle strength bilaterally  CN VIII: auditory acuity intact to bedside testing  CN IX/CN X: normal palate elevation  CN XI: normal strength of trapezius and SCM muscles bilaterally.  CN XII: tongue protrudes in the midline, no atrophy or fasciculations.    Left: + greater occipital nerve tenderness, + lesser occipital nerve tenderness   Right: ++ greater occipital nerve tenderness, ++ lesser occipital nerve tenderness     Motor Exam:   Muscle Bulk: No atrophy or fasciculations   Muscle Tone: normal tone in upper and lower extremities   Involuntary movements: none    Strength:    5/5       Sensory:   Light touch: intact in all 4 extremities.     Coordination:   Normal: Finger to nose: no ataxia or  dysmetria     Gait:   Arises independently; normal posture; gait stable with normal stride length, rate, base and arm swing.       LABS/DATA:  Component      Latest Ref Rn 3/9/2024   WBC      4.0 - 11.0 x10(3) uL 5.3    RBC      3.80 - 5.30 x10(6)uL 4.60    Hemoglobin      12.0 - 16.0 g/dL 14.1    Hematocrit      35.0 - 48.0 % 42.7    MCV      80.0 - 100.0 fL 92.8    MCH      26.0 - 34.0 pg 30.7    MCHC      31.0 - 37.0 g/dL 33.0    RDW-SD      35.1 - 46.3 fL 42.4    RDW      11.0 - 15.0 % 12.3    Platelet Count      150.0 - 450.0 10(3)uL 409.0    Prelim Neutrophil Abs      1.50 - 7.70 x10 (3) uL 2.54    Neutrophils Absolute      1.50 - 7.70 x10(3) uL 2.54    Lymphocytes Absolute      1.00 - 4.00 x10(3) uL 2.29    Monocytes Absolute      0.10 - 1.00 x10(3) uL 0.34    Eosinophils Absolute      0.00 - 0.70 x10(3) uL 0.12    Basophils Absolute      0.00 - 0.20 x10(3) uL 0.03    Immature Granulocyte Absolute      0.00 - 1.00 x10(3) uL 0.01    Neutrophils %      % 47.5    Lymphocytes %      % 43.0    Monocytes %      % 6.4    Eosinophils %      % 2.3    Basophils %      % 0.6    Immature Granulocyte %      % 0.2    Glucose      70 - 99 mg/dL 88    Sodium      136 - 145 mmol/L 139    Potassium      3.5 - 5.1 mmol/L 3.7    Chloride      98 - 112 mmol/L 111    Carbon Dioxide, Total      21.0 - 32.0 mmol/L 26.0    ANION GAP      0 - 18 mmol/L 2    BUN      9 - 23 mg/dL 13    CREATININE      0.55 - 1.02 mg/dL 0.84    BUN/CREATININE RATIO      10.0 - 20.0  15.5    CALCIUM      8.7 - 10.4 mg/dL 9.6    CALCULATED OSMOLALITY      275 - 295 mOsm/kg 288    EGFR      >=60 mL/min/1.73m2 93    AST (SGOT)      <=34 U/L 28    ALT (SGPT)      10 - 49 U/L 19    ALKALINE PHOSPHATASE      37 - 98 U/L 40    Total Bilirubin      0.3 - 1.2 mg/dL 0.7    Bilirubin, Direct      <=0.3 mg/dL 0.2    PROTEIN, TOTAL      5.7 - 8.2 g/dL 7.7    Albumin      3.2 - 4.8 g/dL 4.7            IMAGING:  MRI  CTA H/N  I PERSONALLY REVIEWED THESE IMAGES.      ASSESSMENT:  The patient is a 34 year old woman with past medical history of bipolar disorder who presents with neck pain and headache.      MRI brain: no acute findings.  CTA H/N: diminutive vertebral arteries and basilar artery.    Her neurological examination is significant for R>L ANTONIETA/TANNER tenderness.      Chronic migraine with aura (scotomas)   Cervicogenic headaches  Bilateral occipital neuralgia   Medication overuse headache   -Preventative: Propranolol 10 mg twice daily   Future considerations: Gabapentin, Zonisamide.  Avoid antidepressants due to bipolar disorder.  Occipital nerve blocks  -Abortive: Eletriptan.  Stop Tramadol.  Stop overuse of OTCs.   Future considerations: Avoid Rizatriptan due to Propranolol use.  Zomig, Naratriptan, Ubrelvy or Nurtec.     Limit non-CGRP rescue medications <2 times per week to avoid developing medication overuse headaches   -Neuroimaging: MRI C spine   -Yearly optometry/ophthalmology dilated eye exam for ocular health     -Discussed Prednisone course but she is going to [place] tomorrow and did not want to try anything new while traveling   -PT for neck  -No estrogen due to MA   -Follow up: 3 months   -Lifestyle information provided    -Patient will let my office know if she becomes pregnant or plan to become pregnant so we can adjust/stop medication safely - had a hysterectomy 2019     Vertigo   Meclizine as needed         Discussed indication, administration, dose, and side effects with patient of any medications personally prescribed. Patient was advised to let my office know if they have any questions or concerns.       Today, I personally spent 22 minutes in this case, including chart review, time spent with patient doing face to face evaluation w/ interview and exam and patient education, counseling, and time was spent in patient education, counseling, and coordination of care as described above.   Issues discussed: Diagnosis and implications on future health,  benefits and side effects of present and future medications, test results as well as further testing and medications required.    This note was prepared using Dragon Medical voice recognition dictation software and as a result, errors may occur. When identified, these errors have been corrected. While every attempt is made to correct errors during dictation, discrepancies may still exist    ESEQUIEL Persaud DO   Staff Neurologist   3/14/2024  8:39 AM

## 2024-06-11 DIAGNOSIS — G43.E11 INTRACTABLE CHRONIC MIGRAINE WITH AURA WITH STATUS MIGRAINOSUS: ICD-10-CM

## 2024-06-11 RX ORDER — PROPRANOLOL HYDROCHLORIDE 10 MG/1
10 TABLET ORAL 2 TIMES DAILY
Qty: 180 TABLET | Refills: 0 | Status: SHIPPED | OUTPATIENT
Start: 2024-06-11

## 2024-06-11 NOTE — TELEPHONE ENCOUNTER
Requested Prescriptions     Pending Prescriptions Disp Refills    PROPRANOLOL 10 MG Oral Tab [Pharmacy Med Name: PROPRANOLOL 10MG TABLETS] 180 tablet 0     Sig: TAKE 1 TABLET(10 MG) BY MOUTH TWICE DAILY      LOV: 3/14/24  NOV: 7/16/24    Last refill/[place]: 3/14/24 qty 180

## 2024-07-09 ENCOUNTER — PATIENT MESSAGE (OUTPATIENT)
Dept: CASE MANAGEMENT | Age: 34
End: 2024-07-09

## 2024-07-09 ENCOUNTER — TELEPHONE (OUTPATIENT)
Dept: CASE MANAGEMENT | Age: 34
End: 2024-07-09

## 2024-07-09 NOTE — TELEPHONE ENCOUNTER
MRI Cervical Spine        Status: DENIED        Reference number [id number]     A copy of the denial letter is filed under the MEDIA tab, reference for complete details. You may reach out to Shweta at [phone] to discuss decision.     Please reach out to patient with plan of care.      Thank you

## 2024-07-09 NOTE — TELEPHONE ENCOUNTER
Cervical Spine MRI Denied by [facility]-order ID#[id number]   Denial rationale-Your doctor told us that you have neck and arm pain. Your doctor ordered an MRI of your neck. An MRI is a way to take pictures of the inside of your body. This test is needed when all following criteria are met: (1) Your pain has not improved after six weeks of treatment. (2) Treatment should include medications and other forms of therapy. These need to include home exercises or physical therapy. (3) Your doctor can use the test results to decide on the best treatment plan for you. You may need to have a procedure or surgery. We reviewed the notes we have. The notes do not show that you have had at least six weeks of such treatment. The notes do not show that you may need to have a procedure or surgery. Based on the information we have, this test is not medically necessary. We used [facility] Medical Benefits Management Clinical Guideline titled Imaging of the Spine to make this decision. You may view this guideline at [email or web address]/mbm-[email or web address].    Peer to Peer can be done by calling [phone]

## 2024-07-22 ENCOUNTER — OFFICE VISIT (OUTPATIENT)
Dept: NEUROLOGY | Facility: CLINIC | Age: 34
End: 2024-07-22
Payer: COMMERCIAL

## 2024-07-22 VITALS — WEIGHT: 214 LBS | HEIGHT: 67 IN | RESPIRATION RATE: 16 BRPM | BODY MASS INDEX: 33.59 KG/M2

## 2024-07-22 DIAGNOSIS — M54.81 BILATERAL OCCIPITAL NEURALGIA: ICD-10-CM

## 2024-07-22 DIAGNOSIS — G43.109 MIGRAINE WITH AURA AND WITHOUT STATUS MIGRAINOSUS, NOT INTRACTABLE: Primary | ICD-10-CM

## 2024-07-22 DIAGNOSIS — R42 VERTIGO: ICD-10-CM

## 2024-07-22 RX ORDER — MECLIZINE HYDROCHLORIDE 25 MG/1
25 TABLET ORAL
COMMUNITY
End: 2024-07-22

## 2024-07-22 RX ORDER — LAMOTRIGINE 200 MG/1
TABLET ORAL
COMMUNITY
Start: 2021-12-14

## 2024-07-22 RX ORDER — MECLIZINE HYDROCHLORIDE 25 MG/1
25 TABLET ORAL 3 TIMES DAILY PRN
Qty: 180 TABLET | Refills: 0 | Status: SHIPPED | OUTPATIENT
Start: 2024-07-22 | End: 2024-10-20

## 2024-07-22 RX ORDER — PROPRANOLOL HYDROCHLORIDE 10 MG/1
10 TABLET ORAL 2 TIMES DAILY
Qty: 180 TABLET | Refills: 3 | Status: SHIPPED | OUTPATIENT
Start: 2024-07-22 | End: 2025-07-17

## 2024-07-22 RX ORDER — IBUPROFEN 600 MG/1
600 TABLET ORAL
COMMUNITY
Start: 2024-04-02

## 2024-07-22 RX ORDER — QUETIAPINE FUMARATE 100 MG/1
TABLET, FILM COATED ORAL NIGHTLY
COMMUNITY

## 2024-07-22 NOTE — PROGRESS NOTES
[facility]  [address], [address]  [facility] [address]  [phone]          Established  Follow Up Visit       Date: July 22, 2024  Patient Name: Mary Tony   MRN: [id number]  Primary physician: [address]  [facility] [address]    Interval History:   -- 7/1/2024/rheumatology for chronic polyarticular arthritis, they ordered blood work.    -- Had 1 migraine due to running out of Propranolol at work and 3 48-hour shifts.  No other migraines.      -- Tolerating Propranolol, rarely with exercise intolerance initially.      -- Still having neck pain, bilateral along occipital nerves.      -- No daily headaches.      -- Vertigo has improved.      OUTPATIENT MEDICATIONS  Current Outpatient Medications on File Prior to Visit   Medication Sig Dispense Refill    ibuprofen 600 MG Oral Tab Take 1 tablet (600 mg total) by mouth.      meclizine 25 MG Oral Tab Take 1 tablet (25 mg total) by mouth.      lamoTRIgine 200 MG Oral Tab Take 1 tablet by mouth daily. 1 refill only. Appointment needed for additional refills      PROPRANOLOL 10 MG Oral Tab TAKE 1 TABLET(10 MG) BY MOUTH TWICE DAILY 180 tablet 0    Eletriptan Hydrobromide 40 MG Oral Tab use at onset; may repeat once after 4 hours- ONLY 2 IN 24 HOUR PERIOD MAX.  This is a 30 day supply. 8 tablet 0    QUEtiapine 100 MG Oral Tab Take 0.5-1 tablets ( mg total) by mouth nightly. AT BEDTIME      QUEtiapine 50 MG Oral Tab Take 1 tablet (50 mg total) by mouth nightly.       No current facility-administered medications on file prior to visit.         PHYSICAL EXAM:   Resp 16   Ht 67\"   Wt 214 lb (97.1 kg)   BMI 33.52 kg/m²   General appearance: Well appearing, and in no acute distress  Skin: skin color, texture normal.  No rashes or lesions.    Head: Normocephalic, atraumatic.    Neurological exam:    Mental Status:   Attention/Concentration: intact attention on bedside test   Fund of knowledge: intact  Speech: no dysarthria or  aphasia       LABS/DATA:  MELVIN negative, SLOAN panel negative  CCP antibody, RF negative  CBC, LFTs and BMP all unremarkable    IMAGING:  N/A    ASSESSMENT:  The patient is a 34 year old woman with past medical history of bipolar disorder who presents for follow-up.     MRI brain: no acute findings.  CTA H/N: diminutive vertebral arteries and basilar artery.    Her neurological examination was significant for R>L ANTONIETA/TANNER tenderness.       Migraine with aura (white scotomas) transformed from chronic migraine.  She is doing very well on Propranolol.    Bilateral occipital neuralgia   Vertigo   -Preventative: Propranolol 10 mg twice daily   Future considerations: Gabapentin, Zonisamide.  Avoid antidepressants due to bipolar disorder.  Occipital nerve blocks  -Abortive: Eletriptan PRN.  Meclizine PRN.                 Future considerations: Avoid Rizatriptan due to Propranolol use.  Zomig, Naratriptan, Ubrelvy or Nurtec.                  Limit non-CGRP rescue medications <2 times per week to avoid developing medication overuse headaches   -Neuroimaging: MRI C spine denied by insurance, we have decided to hold off on pursuing imaging   -Yearly optometry/ophthalmology dilated eye exam for ocular health     -She will do home PT exercises for her neck   -No estrogen due to MA   -Follow up: 3 months   -Lifestyle information provided    -Patient will let my office know if she becomes pregnant or plan to become pregnant so we can adjust/stop medication safely - had a hysterectomy 2019        Discussed indication, administration, dose, and side effects with patient of any medications personally prescribed. Patient was advised to let my office know if they have any questions or concerns.       Today, I personally spent 11 minutes in this case, including chart review, time spent with patient doing face to face evaluation w/ interview and exam and patient education, counseling, and time was spent in patient education, counseling, and  coordination of care as described above.   Issues discussed: Diagnosis and implications on future health, benefits and side effects of present and future medications, test results as well as further testing and medications required.    This note was prepared using Dragon Medical voice recognition dictation software and as a result, errors may occur. When identified, these errors have been corrected. While every attempt is made to correct errors during dictation, discrepancies may still exist    ESEQUIEL Persaud DO   Staff Physician, Neurology   7/22/2024  10:50 AM

## 2025-02-14 ENCOUNTER — PATIENT MESSAGE (OUTPATIENT)
Dept: NEUROLOGY | Facility: CLINIC | Age: 35
End: 2025-02-14

## 2025-04-02 ENCOUNTER — OFFICE VISIT (OUTPATIENT)
Dept: NEUROLOGY | Facility: CLINIC | Age: 35
End: 2025-04-02

## 2025-04-02 DIAGNOSIS — G43.109 MIGRAINE WITH AURA AND WITHOUT STATUS MIGRAINOSUS, NOT INTRACTABLE: Primary | ICD-10-CM

## 2025-04-02 DIAGNOSIS — G43.E11 INTRACTABLE CHRONIC MIGRAINE WITH AURA WITH STATUS MIGRAINOSUS: ICD-10-CM

## 2025-04-02 PROCEDURE — 99214 OFFICE O/P EST MOD 30 MIN: CPT | Performed by: OTHER

## 2025-04-02 RX ORDER — PROPRANOLOL HYDROCHLORIDE 10 MG/1
10 TABLET ORAL 2 TIMES DAILY
Qty: 180 TABLET | Refills: 3 | Status: SHIPPED | OUTPATIENT
Start: 2025-04-02 | End: 2026-03-28

## 2025-04-02 RX ORDER — ELETRIPTAN HYDROBROMIDE 40 MG/1
TABLET, FILM COATED ORAL
Qty: 8 TABLET | Refills: 11 | Status: SHIPPED | OUTPATIENT
Start: 2025-04-02

## 2025-04-02 NOTE — PROGRESS NOTES
[facility] [address], [address]  [facility] [address]  [phone]          Established  Follow Up Visit       Date: April 2, 2025  Patient Name: Mary Tony   MRN: [id number]  Primary physician: [address]  [facility] [address]    Interval History:   -- The patient is doing extremely well from a migraine and neurological perspective.  She has not had a flareup of her migraines and is back having 0 migraines per month.  She is adherent to propranolol and is not even had to use Rama triptan as needed.  She is not having vertigo.  She is not having occipital neuralgia anymore.  There are no new neurological symptoms.      OUTPATIENT MEDICATIONS  Medications Ordered Prior to Encounter[1]      PHYSICAL EXAM:     There were no vitals taken for this visit.  General appearance: Well appearing, and in no acute distress  Skin: skin color, texture normal.  No rashes or lesions.    Head: Normocephalic, atraumatic.    Neurological:  Mental Status: Alert, oriented to situation/normal fund of knowledge, Follows commands, and Speech fluent and appropriate.  Cranial Nerves: visual fields intact to confrontation, extraocular movements intact, facial sensation intact, face symmetric, no facial droop or ptosis, normal bedside auditory acuity, no dysarthria  Motor: muscle strength 5/5 both upper and lower extremities  Reflexes: UE and LE reflexes are equal and reactive  Sensation: intact to light touch  Coordination: Finger-to- nose-finger intact bilaterally   Gait: normal-based.      LABS/DATA:  NO new pertinent labs       IMAGING:  No new neuroimaging     ASSESSMENT:  The patient is a 35 year old woman with past medical history of migraine with aura, bilateral occipital neuralgia and bipolar disorder who present for follow-up.     MRI brain: no acute findings.  CTA H/N: diminutive vertebral arteries and basilar artery.    Her neurological examination was significant for R>L ANTONIETA/TANNER  tenderness.       Migraine with aura (white scotomas) transformed from chronic migraine.  She is doing very well on Propranolol.  She is not having any migraines per month; if this changes and migraines do not respond to Eletriptan she will let us know.     Bilateral occipital neuralgia resolved  -Preventative: Propranolol 10 mg twice daily   Future considerations: Gabapentin, Zonisamide.  Avoid antidepressants due to bipolar disorder.  Occipital nerve blocks  -Abortive: Eletriptan PRN.                  Future considerations: Avoid Rizatriptan due to Propranolol use.  Zomig, Naratriptan, Ubrelvy or Nurtec.                  Limit non-CGRP rescue medications <2 times per week to avoid developing medication overuse headaches   -Home PT exercises for her neck   -No estrogen due to MA   -Follow up: 6 months     Discussed indication, administration, dose, and side effects with patient of any medications personally prescribed. Patient was advised to let my office know if they have any questions or concerns.       Today, I personally spent 15 minutes in this case, including chart review, time spent with patient doing face to face evaluation w/ interview and exam and patient education, counseling, and time was spent in patient education, counseling, and coordination of care as described above.   Issues discussed: Diagnosis and implications on future health, benefits and side effects of present and future medications, test results as well as further testing and medications required.    This note was prepared using Dragon Medical voice recognition dictation software and as a result, errors may occur. When identified, these errors have been corrected. While every attempt is made to correct errors during dictation, discrepancies may still exist    ESEQUIEL Persaud DO   Staff Physician, Neurology   4/2/2025  2:53 PM               [1]   Current Outpatient Medications on File Prior to Visit   Medication Sig Dispense Refill    ibuprofen  600 MG Oral Tab Take 1 tablet (600 mg total) by mouth.      lamoTRIgine 200 MG Oral Tab Take 1 tablet by mouth daily. 1 refill only. Appointment needed for additional refills      QUEtiapine 100 MG Oral Tab Take 0.5-1 tablets ( mg total) by mouth nightly. AT BEDTIME      propranolol 10 MG Oral Tab Take 1 tablet (10 mg total) by mouth 2 (two) times daily. 180 tablet 3    Eletriptan Hydrobromide 40 MG Oral Tab use at onset; may repeat once after 4 hours- ONLY 2 IN 24 HOUR PERIOD MAX.  This is a 30 day supply. 8 tablet 0     No current facility-administered medications on file prior to visit.

## 2025-04-15 ENCOUNTER — PATIENT MESSAGE (OUTPATIENT)
Dept: NEUROLOGY | Facility: CLINIC | Age: 35
End: 2025-04-15